# Patient Record
(demographics unavailable — no encounter records)

---

## 2024-11-11 NOTE — DISCUSSION/SUMMARY
[FreeTextEntry1] : # Pelvic organ prolapse: - Continue use of Cube # 4.   - Pessary instructions were reviewed.  #Vaginal atrophy -Continue vaginal estrogen cream  - She will RTO in 2 months or sooner for follow up and pessary check/prolapse.  All questions answered to the patient's satisfaction.  She expressed understanding. She knows to contact the office with any questions or concerns.

## 2024-11-11 NOTE — PROCEDURE
[Good Fit] : fits well [Erythema] : erythema noted [Discharge] : there is vaginal discharge [Pessary Inserted] : inserted [Pessary Washed] : washed [Mild] : mild bleeding [Medication Review] : Medicaiton Review: Patient verbalizes understanding of risks and benefits [Refit] : refit is not needed [Erosion] : no evidence of erosion [Infection] : no evidence of infection [FreeTextEntry1] : Cube # 4 [de-identified] : posterior wall [de-identified] : at introitus upon removal of pessary [FreeTextEntry3] : vaginal lubrication [FreeTextEntry8] : Reinforced daily pericare.

## 2024-11-11 NOTE — HISTORY OF PRESENT ILLNESS
[FreeTextEntry1] : Johanna is a 72 y/o female with known POP supported with Cube # 4.  She is happy with the support provided by the pessary.   She is urinating and moving her bowels without problems.  Denies any vaginal bleeding, pelvic pain or discomfort. She is on vaginal estrogen cream.

## 2024-11-11 NOTE — PHYSICAL EXAM
[No Acute Distress] : in no acute distress [Well developed] : well developed [Well Nourished] : ~L well nourished [Good Hygeine] : demonstrates good hygeine [Oriented x3] : oriented to person, place, and time [Normal Memory] : ~T memory was ~L unimpaired [Normal Mood/Affect] : mood and affect are normal [Warm and Dry] : was warm and dry to touch [Normal Gait] : gait was normal [No Lesions] : no lesions were seen on the external genitalia [Vulvar Atrophy] : vulvar atrophy [Labia Majora] : were normal [Labia Minora] : were normal [Normal] : was normal [Normal Appearance] : general appearance was normal [Atrophy] : atrophy [Erythematous] : erythema [Dry Mucosa] : dry mucosa [Discharge] : a  ~M vaginal discharge was present [Scant] : scant [Vinay] : yellow [Thin] : thin [No Bleeding] : there was no active vaginal bleeding [FreeTextEntry4] : small bleeding at introitus upon removal of pessary

## 2025-01-27 NOTE — PROCEDURE
[Good Fit] : fits well [Erythema] : erythema noted [Discharge] : there is vaginal discharge [Pessary Inserted] : inserted [Pessary Washed] : washed [Mild] : mild bleeding [Medication Review] : Medicaiton Review: Patient verbalizes understanding of risks and benefits [Refit] : refit is not needed [Erosion] : no evidence of erosion [Infection] : no evidence of infection [FreeTextEntry1] : Cube # 4 [de-identified] : posterior wall [de-identified] : at introitus upon removal of pessary [FreeTextEntry3] : vaginal lubrication [FreeTextEntry8] : Reinforced daily pericare.

## 2025-01-27 NOTE — HISTORY OF PRESENT ILLNESS
[FreeTextEntry1] : Johanna is a 75 y/o female with known POP supported with Cube # 4.  She is happy with the support provided by the pessary.   She is urinating and moving her bowels without problems.  Denies any vaginal bleeding, pelvic pain or discomfort. She is on vaginal estrogen cream.

## 2025-01-27 NOTE — PROCEDURE
[Good Fit] : fits well [Erythema] : erythema noted [Discharge] : there is vaginal discharge [Pessary Inserted] : inserted [Pessary Washed] : washed [Mild] : mild bleeding [Medication Review] : Medicaiton Review: Patient verbalizes understanding of risks and benefits [Refit] : refit is not needed [Erosion] : no evidence of erosion [Infection] : no evidence of infection [de-identified] : posterior wall [FreeTextEntry1] : Cube # 4 [de-identified] : at introitus upon removal of pessary [FreeTextEntry3] : vaginal lubrication [FreeTextEntry8] : Reinforced daily pericare.

## 2025-03-17 NOTE — PROCEDURE
[Good Fit] : fits well [Erythema] : erythema noted [Discharge] : there is vaginal discharge [Pessary Inserted] : inserted [Pessary Washed] : washed [Mild] : mild bleeding [Medication Review] : Medicaiton Review: Patient verbalizes understanding of risks and benefits [Refit] : refit is not needed [Erosion] : no evidence of erosion [Infection] : no evidence of infection [FreeTextEntry1] : Cube # 4 [de-identified] : posterior wall [de-identified] : at introitus upon removal of pessary [FreeTextEntry3] : vaginal lubrication [FreeTextEntry8] : Reinforced daily pericare.

## 2025-03-17 NOTE — PROCEDURE
[Good Fit] : fits well [Erythema] : erythema noted [Discharge] : there is vaginal discharge [Pessary Inserted] : inserted [Pessary Washed] : washed [Mild] : mild bleeding [Medication Review] : Medicaiton Review: Patient verbalizes understanding of risks and benefits [Refit] : refit is not needed [Erosion] : no evidence of erosion [Infection] : no evidence of infection [FreeTextEntry1] : Cube # 4 [de-identified] : posterior wall [de-identified] : at introitus upon removal of pessary [FreeTextEntry3] : vaginal lubrication [FreeTextEntry8] : Reinforced daily pericare.